# Patient Record
Sex: MALE | Race: WHITE | NOT HISPANIC OR LATINO | URBAN - METROPOLITAN AREA
[De-identification: names, ages, dates, MRNs, and addresses within clinical notes are randomized per-mention and may not be internally consistent; named-entity substitution may affect disease eponyms.]

---

## 2022-03-18 PROBLEM — Z00.00 ENCOUNTER FOR PREVENTIVE HEALTH EXAMINATION: Status: ACTIVE | Noted: 2022-03-18

## 2022-03-19 ENCOUNTER — OUTPATIENT (OUTPATIENT)
Dept: OUTPATIENT SERVICES | Facility: HOSPITAL | Age: 82
LOS: 1 days | End: 2022-03-19
Payer: MEDICARE

## 2022-03-19 ENCOUNTER — APPOINTMENT (OUTPATIENT)
Dept: CT IMAGING | Facility: IMAGING CENTER | Age: 82
End: 2022-03-19
Payer: MEDICARE

## 2022-03-19 DIAGNOSIS — J32.1 CHRONIC FRONTAL SINUSITIS: ICD-10-CM

## 2022-03-19 PROCEDURE — 70486 CT MAXILLOFACIAL W/O DYE: CPT | Mod: 26,MH

## 2022-03-19 PROCEDURE — 70486 CT MAXILLOFACIAL W/O DYE: CPT | Mod: MH

## 2023-09-05 ENCOUNTER — NON-APPOINTMENT (OUTPATIENT)
Age: 83
End: 2023-09-05

## 2023-09-06 ENCOUNTER — APPOINTMENT (OUTPATIENT)
Dept: PULMONOLOGY | Facility: CLINIC | Age: 83
End: 2023-09-06
Payer: MEDICARE

## 2023-09-06 VITALS
OXYGEN SATURATION: 96 % | SYSTOLIC BLOOD PRESSURE: 116 MMHG | BODY MASS INDEX: 22.91 KG/M2 | HEART RATE: 79 BPM | WEIGHT: 146 LBS | TEMPERATURE: 97.5 F | HEIGHT: 67 IN | DIASTOLIC BLOOD PRESSURE: 70 MMHG

## 2023-09-06 DIAGNOSIS — R05.9 COUGH, UNSPECIFIED: ICD-10-CM

## 2023-09-06 PROCEDURE — 94060 EVALUATION OF WHEEZING: CPT

## 2023-09-06 PROCEDURE — 94729 DIFFUSING CAPACITY: CPT

## 2023-09-06 PROCEDURE — 94727 GAS DIL/WSHOT DETER LNG VOL: CPT

## 2023-09-06 PROCEDURE — ZZZZZ: CPT

## 2023-09-06 PROCEDURE — 99205 OFFICE O/P NEW HI 60 MIN: CPT | Mod: 25

## 2023-09-06 RX ORDER — GABAPENTIN 100 MG/1
100 CAPSULE ORAL
Refills: 0 | Status: ACTIVE | COMMUNITY

## 2023-09-06 RX ORDER — FENOFIBRATE 120 MG/1
TABLET ORAL
Refills: 0 | Status: ACTIVE | COMMUNITY

## 2023-09-06 RX ORDER — PRAVASTATIN SODIUM 20 MG/1
20 TABLET ORAL
Refills: 0 | Status: ACTIVE | COMMUNITY

## 2023-09-06 RX ORDER — ASPIRIN 81 MG
81 TABLET, DELAYED RELEASE (ENTERIC COATED) ORAL
Refills: 0 | Status: ACTIVE | COMMUNITY

## 2023-09-06 RX ORDER — AMLODIPINE BESYLATE 2.5 MG/1
2.5 TABLET ORAL
Refills: 0 | Status: ACTIVE | COMMUNITY

## 2023-09-06 RX ORDER — FOLIC ACID 1 MG/1
1 TABLET ORAL
Refills: 0 | Status: ACTIVE | COMMUNITY

## 2023-09-06 NOTE — ASSESSMENT
[FreeTextEntry1] : CT 6 months if cannot obtain old CT from [facility] 2019.  Task sent as reminder. I have held off on chronic bronchodilator therapy at this time since he is relatively asymptomatic.  Given PRN albuterol. Observation.  Follow-up post CT or sooner on a as needed basis.

## 2023-09-06 NOTE — CONSULT LETTER
[Dear  ___] : Dear ~MARY, [Consult Letter:] : I had the pleasure of evaluating your patient, [unfilled]. [Consult Closing:] : Thank you very much for allowing me to participate in the care of this patient.  If you have any questions, please do not hesitate to contact me. [Sincerely,] : Sincerely, [FreeTextEntry2] : Eugenia Butt DO [FreeTextEntry3] : [facility] MD [facility]

## 2023-09-06 NOTE — PHYSICAL EXAM
[No Acute Distress] : no acute distress [Normal Oropharynx] : normal oropharynx [Normal Appearance] : normal appearance [No Neck Mass] : no neck mass [Normal Rate/Rhythm] : normal rate/rhythm [Normal S1, S2] : normal s1, s2 [No Murmurs] : no murmurs [No Resp Distress] : no resp distress [Clear to Auscultation Bilaterally] : clear to auscultation bilaterally [Normal to Percussion] : normal to percussion [No Abnormalities] : no abnormalities [Benign] : benign [No HSM] : no hsm [Normal Gait] : normal gait [No Clubbing] : no clubbing [No Cyanosis] : no cyanosis [No Edema] : no edema [Normal Color/ Pigmentation] : normal color/ pigmentation [No Focal Deficits] : no focal deficits [Oriented x3] : oriented x3 [Normal Affect] : normal affect

## 2023-09-06 NOTE — PROCEDURE
[FreeTextEntry1] : Chest radiograph of over 17 2023 noted and reviewed.  CT of the chest of August 25, 2023 reviewed and discussed with patient and daughter. There are multifocal areas of tree-in-bud and nodular opacities most consistent with areas of mucoid impaction.  Small pulmonary nodules.  No significant adenopathy.  No definitive bronchiectasis Focal density left upper lobe medially most likely related to scarring.  Underlying lesion cannot be excluded.  09/06/2023 Pulmonary function testing FEV1, FVC, and FEV1/FVC are within normal limits. TLC and subdivisions are normal. RV/TLC ratio is normal. Resistance and specific conductance are normal. Single breath diffusion capacity is normal.

## 2023-09-06 NOTE — HISTORY OF PRESENT ILLNESS
[Former] : former [TextBox_4] : MIR ABBASI is a 83 year old  M referred for pulmonary evaluation for  12/22 COVID. Was ill. No hospitaliaztion. Took Paxlovid. No CXR. Developed bronchitis with cough which progressed. Now failry mild. Took prednisone with positive response. 5 days last week 20 mg.  Presently minimal cough. Predominanlty dry Sent for CT last week. Told mild aneurysm.  No CP or SOB. No wheezing.   Past pulmonary history. Abnormal CXR.  Occupational Exposure. .  Family history of pulmonary disease. N Recent travel N Pets Dog not allergic.   no prior CT chest.   No GERD Positive PN Drip.   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  On zanubrutinib 80mg.   [YearQuit] : 2014 [TextBox_22] : Discontinued 5 weeks.

## 2023-09-06 NOTE — DISCUSSION/SUMMARY
[FreeTextEntry1] : Multifocal areas of nodular tree-in-bud opacities likely infectious inflammatory in origin.  Consider chronic infectious etiology such as atypical mycobacterial disease. Pulmonary nodules. Left upper lobe abnormality also most likely infectious inflammatory in origin but underlying neoplastic process cannot be excluded.  Warrants follow-up. Cough now essentially resolved. Component of mild chronic obstructive pulmonary disease.

## 2023-09-15 ENCOUNTER — NON-APPOINTMENT (OUTPATIENT)
Age: 83
End: 2023-09-15

## 2023-09-18 ENCOUNTER — APPOINTMENT (OUTPATIENT)
Dept: HEART AND VASCULAR | Facility: CLINIC | Age: 83
End: 2023-09-18
Payer: MEDICARE

## 2023-09-18 ENCOUNTER — NON-APPOINTMENT (OUTPATIENT)
Age: 83
End: 2023-09-18

## 2023-09-18 DIAGNOSIS — Z86.79 PERSONAL HISTORY OF OTHER DISEASES OF THE CIRCULATORY SYSTEM: ICD-10-CM

## 2023-09-18 DIAGNOSIS — Z85.79 PERSONAL HISTORY OF OTHER MALIGNANT NEOPLASMS OF LYMPHOID, HEMATOPOIETIC AND RELATED TISSUES: ICD-10-CM

## 2023-09-18 DIAGNOSIS — Z86.39 PERSONAL HISTORY OF OTHER ENDOCRINE, NUTRITIONAL AND METABOLIC DISEASE: ICD-10-CM

## 2023-09-18 PROCEDURE — 99205 OFFICE O/P NEW HI 60 MIN: CPT

## 2023-09-20 VITALS
SYSTOLIC BLOOD PRESSURE: 131 MMHG | WEIGHT: 145.06 LBS | RESPIRATION RATE: 16 BRPM | TEMPERATURE: 98 F | DIASTOLIC BLOOD PRESSURE: 62 MMHG | HEIGHT: 66 IN | OXYGEN SATURATION: 98 % | HEART RATE: 68 BPM

## 2023-09-20 RX ORDER — CHLORHEXIDINE GLUCONATE 213 G/1000ML
1 SOLUTION TOPICAL ONCE
Refills: 0 | Status: DISCONTINUED | OUTPATIENT
Start: 2023-09-26 | End: 2023-09-27

## 2023-09-20 NOTE — H&P ADULT - NSICDXPASTMEDICALHX_GEN_ALL_CORE_FT
PAST MEDICAL HISTORY:  CAD (coronary artery disease)     COPD, mild     HLD (hyperlipidemia)     HTN (hypertension)     Lymphoma

## 2023-09-20 NOTE — H&P ADULT - HISTORY OF PRESENT ILLNESS
INCOMPLETE  Doctor: Anirudh  Pharmacy:  Escort:    84 yo M with PMHx HTN, HLD, CAD (s/p 2 stents in 2021), lymphoma (dx 2014 on chemotherapy), COPD (? hospitalizations), presents to Dr. Oleary for evaluation of splenic artery aneurysm. 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 Pt denies any abdominal pain.     Pt ___ denies palpitations, orthopnea, PND, leg edema, n/v/d, fever, chills, hematochezia, hematemesis, melena, BRBPR and other symptoms.     CT Chest without IV Contrast 8/29/23: 3.2cm peripherally calcified structure in the LUQ likely splenic artery aneurysm, incompletely evaluated on the non contrast study. Few scattered multilobar multifocal tree in bud branching nodular opacities consistent with small airways disease/mucoid impaction likely infectious or inflammatory bronchiolitis. Few small subcentimeter pulmonary nodules measuring less than 4mm.     In light of pt's risk factors and abnormal CT finding, pt presents for splenic artery embolization with Dr. Oleary. Doctor: Anirudh  Pharmacy: [facility] (in chart)  Escort: Daughter & son    82 yo M with PMHx HTN, HLD, CAD (s/p 2 stents in 2021), lymphoma (dx 2014 on chemotherapy), COPD (no hx hospitalizations), presents to Dr. Oleary for evaluation of splenic artery aneurysm. 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 Pt denies any abdominal pain. Pt denies palpitations, orthopnea, PND, leg edema, n/v/d, fever, chills, hematochezia, hematemesis, melena, BRBPR and other symptoms.     CT Chest without IV Contrast 8/29/23: 3.2cm peripherally calcified structure in the LUQ likely splenic artery aneurysm, incompletely evaluated on the non contrast study. Few scattered multilobar multifocal tree in bud branching nodular opacities consistent with small airways disease/mucoid impaction likely infectious or inflammatory bronchiolitis. Few small subcentimeter pulmonary nodules measuring less than 4mm.     In light of pt's risk factors and abnormal CT finding, pt presents for splenic artery embolization with Dr. Oleary.

## 2023-09-20 NOTE — H&P ADULT - NSHPLABSRESULTS_GEN_ALL_CORE
EK.5   8.13  )-----------( 312      ( 26 Sep 2023 10:33 )             41.8         PT/INR - ( 26 Sep 2023 10:33 )   PT: 11.3 sec;   INR: 0.99          PTT - ( 26 Sep 2023 10:33 )  PTT:33.3 sec

## 2023-09-20 NOTE — H&P ADULT - ASSESSMENT
84 yo M with PMHx HTN, HLD, CAD (s/p 2 stents in 2021), lymphoma (dx 2014 on chemotherapy), COPD (no hx hospitalizations), presents to [facility] for splenic artery embolization with Dr. Oleary.      ASA III          Mallampati III  -VSS  -No fluids or load indicated  -Consent to be obtained by Dr. Oleary's PA    Risks & benefits of procedure and alternative therapy have been explained to the patient including but not limited to: allergic reaction, bleeding w/possible need for blood transfusion, infection, renal and vascular compromise, limb damage, arrhythmia, stroke, vessel dissection/perforation, Myocardial infarction, emergent CABG. Informed consent obtained and in chart.       Suitable for moderate sedation.

## 2023-09-26 ENCOUNTER — TRANSCRIPTION ENCOUNTER (OUTPATIENT)
Age: 83
End: 2023-09-26

## 2023-09-26 ENCOUNTER — INPATIENT (INPATIENT)
Facility: HOSPITAL | Age: 83
LOS: 0 days | Discharge: HOME | DRG: 272 | End: 2023-09-27
Attending: RADIOLOGY | Admitting: RADIOLOGY
Payer: MEDICARE

## 2023-09-26 DIAGNOSIS — I25.10 ATHEROSCLEROTIC HEART DISEASE OF NATIVE CORONARY ARTERY WITHOUT ANGINA PECTORIS: ICD-10-CM

## 2023-09-26 DIAGNOSIS — Z87.891 PERSONAL HISTORY OF NICOTINE DEPENDENCE: ICD-10-CM

## 2023-09-26 DIAGNOSIS — R91.8 OTHER NONSPECIFIC ABNORMAL FINDING OF LUNG FIELD: ICD-10-CM

## 2023-09-26 DIAGNOSIS — Z97.2 PRESENCE OF DENTAL PROSTHETIC DEVICE (COMPLETE) (PARTIAL): ICD-10-CM

## 2023-09-26 DIAGNOSIS — Z92.21 PERSONAL HISTORY OF ANTINEOPLASTIC CHEMOTHERAPY: ICD-10-CM

## 2023-09-26 DIAGNOSIS — I72.8 ANEURYSM OF OTHER SPECIFIED ARTERIES: ICD-10-CM

## 2023-09-26 DIAGNOSIS — Z85.72 PERSONAL HISTORY OF NON-HODGKIN LYMPHOMAS: ICD-10-CM

## 2023-09-26 DIAGNOSIS — I10 ESSENTIAL (PRIMARY) HYPERTENSION: ICD-10-CM

## 2023-09-26 DIAGNOSIS — J44.9 CHRONIC OBSTRUCTIVE PULMONARY DISEASE, UNSPECIFIED: ICD-10-CM

## 2023-09-26 DIAGNOSIS — Z79.82 LONG TERM (CURRENT) USE OF ASPIRIN: ICD-10-CM

## 2023-09-26 DIAGNOSIS — Z95.5 PRESENCE OF CORONARY ANGIOPLASTY IMPLANT AND GRAFT: ICD-10-CM

## 2023-09-26 DIAGNOSIS — E78.5 HYPERLIPIDEMIA, UNSPECIFIED: ICD-10-CM

## 2023-09-26 LAB
ALBUMIN SERPL ELPH-MCNC: 4.2 G/DL — SIGNIFICANT CHANGE UP (ref 3.3–5)
ALP SERPL-CCNC: 79 U/L — SIGNIFICANT CHANGE UP (ref 40–120)
ALT FLD-CCNC: 22 U/L — SIGNIFICANT CHANGE UP (ref 10–45)
ANION GAP SERPL CALC-SCNC: 8 MMOL/L — SIGNIFICANT CHANGE UP (ref 5–17)
APTT BLD: 33.3 SEC — SIGNIFICANT CHANGE UP (ref 24.5–35.6)
AST SERPL-CCNC: 25 U/L — SIGNIFICANT CHANGE UP (ref 10–40)
BASOPHILS # BLD AUTO: 0.11 K/UL — SIGNIFICANT CHANGE UP (ref 0–0.2)
BASOPHILS NFR BLD AUTO: 1.4 % — SIGNIFICANT CHANGE UP (ref 0–2)
BILIRUB SERPL-MCNC: 0.3 MG/DL — SIGNIFICANT CHANGE UP (ref 0.2–1.2)
BUN SERPL-MCNC: 11 MG/DL — SIGNIFICANT CHANGE UP (ref 7–23)
CALCIUM SERPL-MCNC: 9.4 MG/DL — SIGNIFICANT CHANGE UP (ref 8.4–10.5)
CHLORIDE SERPL-SCNC: 103 MMOL/L — SIGNIFICANT CHANGE UP (ref 96–108)
CO2 SERPL-SCNC: 30 MMOL/L — SIGNIFICANT CHANGE UP (ref 22–31)
CREAT SERPL-MCNC: 1.25 MG/DL — SIGNIFICANT CHANGE UP (ref 0.5–1.3)
EGFR: 57 ML/MIN/1.73M2 — LOW
EOSINOPHIL # BLD AUTO: 0.92 K/UL — HIGH (ref 0–0.5)
EOSINOPHIL NFR BLD AUTO: 11.3 % — HIGH (ref 0–6)
GLUCOSE SERPL-MCNC: 99 MG/DL — SIGNIFICANT CHANGE UP (ref 70–99)
HCT VFR BLD CALC: 41.8 % — SIGNIFICANT CHANGE UP (ref 39–50)
HGB BLD-MCNC: 13.5 G/DL — SIGNIFICANT CHANGE UP (ref 13–17)
IMM GRANULOCYTES NFR BLD AUTO: 0.5 % — SIGNIFICANT CHANGE UP (ref 0–0.9)
INR BLD: 0.99 — SIGNIFICANT CHANGE UP (ref 0.85–1.18)
LYMPHOCYTES # BLD AUTO: 1.35 K/UL — SIGNIFICANT CHANGE UP (ref 1–3.3)
LYMPHOCYTES # BLD AUTO: 16.6 % — SIGNIFICANT CHANGE UP (ref 13–44)
MCHC RBC-ENTMCNC: 32.1 PG — SIGNIFICANT CHANGE UP (ref 27–34)
MCHC RBC-ENTMCNC: 32.3 GM/DL — SIGNIFICANT CHANGE UP (ref 32–36)
MCV RBC AUTO: 99.3 FL — SIGNIFICANT CHANGE UP (ref 80–100)
MONOCYTES # BLD AUTO: 0.95 K/UL — HIGH (ref 0–0.9)
MONOCYTES NFR BLD AUTO: 11.7 % — SIGNIFICANT CHANGE UP (ref 2–14)
NEUTROPHILS # BLD AUTO: 4.76 K/UL — SIGNIFICANT CHANGE UP (ref 1.8–7.4)
NEUTROPHILS NFR BLD AUTO: 58.5 % — SIGNIFICANT CHANGE UP (ref 43–77)
NRBC # BLD: 0 /100 WBCS — SIGNIFICANT CHANGE UP (ref 0–0)
PLATELET # BLD AUTO: 312 K/UL — SIGNIFICANT CHANGE UP (ref 150–400)
POTASSIUM SERPL-MCNC: 4.5 MMOL/L — SIGNIFICANT CHANGE UP (ref 3.5–5.3)
POTASSIUM SERPL-SCNC: 4.5 MMOL/L — SIGNIFICANT CHANGE UP (ref 3.5–5.3)
PROT SERPL-MCNC: 6.8 G/DL — SIGNIFICANT CHANGE UP (ref 6–8.3)
PROTHROM AB SERPL-ACNC: 11.3 SEC — SIGNIFICANT CHANGE UP (ref 9.5–13)
RBC # BLD: 4.21 M/UL — SIGNIFICANT CHANGE UP (ref 4.2–5.8)
RBC # FLD: 14 % — SIGNIFICANT CHANGE UP (ref 10.3–14.5)
SODIUM SERPL-SCNC: 141 MMOL/L — SIGNIFICANT CHANGE UP (ref 135–145)
WBC # BLD: 8.13 K/UL — SIGNIFICANT CHANGE UP (ref 3.8–10.5)
WBC # FLD AUTO: 8.13 K/UL — SIGNIFICANT CHANGE UP (ref 3.8–10.5)

## 2023-09-26 PROCEDURE — 93010 ELECTROCARDIOGRAM REPORT: CPT

## 2023-09-26 PROCEDURE — 37242 VASC EMBOLIZE/OCCLUDE ARTERY: CPT

## 2023-09-26 PROCEDURE — 36246 INS CATH ABD/L-EXT ART 2ND: CPT | Mod: RT

## 2023-09-26 RX ORDER — FOLIC ACID 0.8 MG
1 TABLET ORAL
Refills: 0 | DISCHARGE

## 2023-09-26 RX ORDER — GABAPENTIN 400 MG/1
1 CAPSULE ORAL
Refills: 0 | DISCHARGE

## 2023-09-26 RX ORDER — AMLODIPINE BESYLATE 2.5 MG/1
1 TABLET ORAL
Refills: 0 | DISCHARGE

## 2023-09-26 RX ORDER — KETOROLAC TROMETHAMINE 30 MG/ML
15 SYRINGE (ML) INJECTION EVERY 6 HOURS
Refills: 0 | Status: DISCONTINUED | OUTPATIENT
Start: 2023-09-26 | End: 2023-09-27

## 2023-09-26 RX ORDER — GABAPENTIN 400 MG/1
100 CAPSULE ORAL DAILY
Refills: 0 | Status: DISCONTINUED | OUTPATIENT
Start: 2023-09-27 | End: 2023-09-27

## 2023-09-26 RX ORDER — ZANUBRUTINIB 80 MG/1
2 CAPSULE, GELATIN COATED ORAL
Refills: 0 | DISCHARGE

## 2023-09-26 RX ORDER — ASPIRIN/CALCIUM CARB/MAGNESIUM 324 MG
81 TABLET ORAL DAILY
Refills: 0 | Status: DISCONTINUED | OUTPATIENT
Start: 2023-09-27 | End: 2023-09-27

## 2023-09-26 RX ORDER — ACETAMINOPHEN 500 MG
1000 TABLET ORAL ONCE
Refills: 0 | Status: DISCONTINUED | OUTPATIENT
Start: 2023-09-26 | End: 2023-09-27

## 2023-09-26 RX ORDER — FENOFIBRIC ACID 105 MG/1
1 TABLET ORAL
Refills: 0 | DISCHARGE

## 2023-09-26 RX ORDER — ASPIRIN/CALCIUM CARB/MAGNESIUM 324 MG
1 TABLET ORAL
Refills: 0 | DISCHARGE

## 2023-09-26 RX ORDER — ACETAMINOPHEN 500 MG
650 TABLET ORAL EVERY 6 HOURS
Refills: 0 | Status: DISCONTINUED | OUTPATIENT
Start: 2023-09-26 | End: 2023-09-27

## 2023-09-26 RX ORDER — AMLODIPINE BESYLATE 2.5 MG/1
2.5 TABLET ORAL DAILY
Refills: 0 | Status: DISCONTINUED | OUTPATIENT
Start: 2023-09-27 | End: 2023-09-27

## 2023-09-26 NOTE — BRIEF OPERATIVE NOTE - COMMENTS
Admit to inpatient for observation  Neurovascular check per post cath protocol  Folow up with Dr. Oleary in  6 weeks  Post procedure keflex 500mg PO q6h X5 days.  Pain medication with tylenol 650mg PO q6h when inpatient then continue as prn on discharge.  Will follow Admit to inpatient for observation  Neurovascular check per post cath protocol  Follow up with Dr. Oleary in  6 weeks  Post procedure keflex 500mg PO q6h X5 days.  Pain medication with tylenol 650mg PO q6h when inpatient then continue as prn on discharge.  Will follow Admit to inpatient for observation  Delirium precaution  Neurovascular check per post cath protocol  Bedrest until 11PM 9/26  Post procedure keflex 500mg PO q6h X5 days.  Pain medication with tylenol 650mg PO q6h, toradol 15mg IV q6h PRN for mod-severe pain when inpatient then continue tylenol prn on discharge.  Will follow

## 2023-09-26 NOTE — PRE-ANESTHESIA EVALUATION ADULT - NSANTHOSAYNRD_GEN_A_CORE
No. SOLITARIO screening performed.  STOP BANG Legend: 0-2 = LOW Risk; 3-4 = INTERMEDIATE Risk; 5-8 = HIGH Risk

## 2023-09-26 NOTE — BRIEF OPERATIVE NOTE - NSICDXBRIEFPROCEDURE_GEN_ALL_CORE_FT
PROCEDURES:  Repair splenic artery, percutaneous endoscopic approach 26-Sep-2023 17:10:17  Dejah Wilkerson

## 2023-09-26 NOTE — BRIEF OPERATIVE NOTE - OPERATION/FINDINGS
pt was under GETA,the right CFA was accessed using micropuncture technique and a 5F sheath. Angiogram of celiac trunk performed demonstrating aneurysmal dilation of the splenic artery. Coil embolization performed using a total of 10 detachable coils ( 6mm,8mm and 10mm x20mm and x30mm). Repeat angiogram shows complete occlusion of the aneurysm and with preserved perfusion to the spleen. Sheath removed with manual compression of 15 mins with hemostasis achieved.

## 2023-09-27 ENCOUNTER — TRANSCRIPTION ENCOUNTER (OUTPATIENT)
Age: 83
End: 2023-09-27

## 2023-09-27 VITALS
HEART RATE: 72 BPM | TEMPERATURE: 98 F | DIASTOLIC BLOOD PRESSURE: 54 MMHG | SYSTOLIC BLOOD PRESSURE: 101 MMHG | RESPIRATION RATE: 16 BRPM | OXYGEN SATURATION: 94 %

## 2023-09-27 LAB
AMYLASE P1 CFR SERPL: 52 U/L — SIGNIFICANT CHANGE UP (ref 25–125)
ANION GAP SERPL CALC-SCNC: 8 MMOL/L — SIGNIFICANT CHANGE UP (ref 5–17)
BUN SERPL-MCNC: 10 MG/DL — SIGNIFICANT CHANGE UP (ref 7–23)
CALCIUM SERPL-MCNC: 8.8 MG/DL — SIGNIFICANT CHANGE UP (ref 8.4–10.5)
CHLORIDE SERPL-SCNC: 105 MMOL/L — SIGNIFICANT CHANGE UP (ref 96–108)
CO2 SERPL-SCNC: 25 MMOL/L — SIGNIFICANT CHANGE UP (ref 22–31)
CREAT SERPL-MCNC: 1.08 MG/DL — SIGNIFICANT CHANGE UP (ref 0.5–1.3)
EGFR: 69 ML/MIN/1.73M2 — SIGNIFICANT CHANGE UP
GLUCOSE SERPL-MCNC: 137 MG/DL — HIGH (ref 70–99)
HCT VFR BLD CALC: 37.4 % — LOW (ref 39–50)
HGB BLD-MCNC: 12.3 G/DL — LOW (ref 13–17)
MCHC RBC-ENTMCNC: 32.4 PG — SIGNIFICANT CHANGE UP (ref 27–34)
MCHC RBC-ENTMCNC: 32.9 GM/DL — SIGNIFICANT CHANGE UP (ref 32–36)
MCV RBC AUTO: 98.4 FL — SIGNIFICANT CHANGE UP (ref 80–100)
NRBC # BLD: 0 /100 WBCS — SIGNIFICANT CHANGE UP (ref 0–0)
PLATELET # BLD AUTO: 289 K/UL — SIGNIFICANT CHANGE UP (ref 150–400)
POTASSIUM SERPL-MCNC: 4 MMOL/L — SIGNIFICANT CHANGE UP (ref 3.5–5.3)
POTASSIUM SERPL-SCNC: 4 MMOL/L — SIGNIFICANT CHANGE UP (ref 3.5–5.3)
RBC # BLD: 3.8 M/UL — LOW (ref 4.2–5.8)
RBC # FLD: 14 % — SIGNIFICANT CHANGE UP (ref 10.3–14.5)
SODIUM SERPL-SCNC: 138 MMOL/L — SIGNIFICANT CHANGE UP (ref 135–145)
WBC # BLD: 12.29 K/UL — HIGH (ref 3.8–10.5)
WBC # FLD AUTO: 12.29 K/UL — HIGH (ref 3.8–10.5)

## 2023-09-27 RX ORDER — ACETAMINOPHEN 500 MG
2 TABLET ORAL
Qty: 0 | Refills: 0 | DISCHARGE
Start: 2023-09-27

## 2023-09-27 RX ORDER — CEPHALEXIN 500 MG
1 CAPSULE ORAL
Qty: 20 | Refills: 0
Start: 2023-09-27 | End: 2023-10-01

## 2023-09-27 NOTE — DISCHARGE NOTE PROVIDER - NSDCFUADDINST_GEN_ALL_CORE_FT
Please continue to follow a heart healthy diet, low in sodium, cholesterol and fats.   We recommend a Mediterranean diet:  -Incorporate 2 or more servings per day of vegetables, fruits, and whole grains  -Increase intake of fish and legumes/beans to 2 or more servings per week  -Increase intake of healthy fats, such as olive oil and avocados, and have a handful of nuts/seeds most days  -Reduce red/processed meat consumption to 2 or fewer times per week

## 2023-09-27 NOTE — DISCHARGE NOTE PROVIDER - NSDCCPCAREPLAN_GEN_ALL_CORE_FT
PRINCIPAL DISCHARGE DIAGNOSIS  Diagnosis: Splenic artery aneurysm  Assessment and Plan of Treatment: You received a splenic artery embolization yesterday 9/26/23 with Dr. Oleary. You might be in some discomfort followng this procedure.   Please continue Keflex 500mg q 6 hours x 5 days      SECONDARY DISCHARGE DIAGNOSES  Diagnosis: CAD (coronary artery disease)  Assessment and Plan of Treatment:     Diagnosis: HLD (hyperlipidemia)  Assessment and Plan of Treatment:     Diagnosis: HTN (hypertension)  Assessment and Plan of Treatment:      PRINCIPAL DISCHARGE DIAGNOSIS  Diagnosis: Splenic artery aneurysm  Assessment and Plan of Treatment: You received a splenic artery embolization yesterday 9/26/23 with Dr. Oleary. You might be in some discomfort followng this procedure.   Please start Keflex 500mg every 6 hours for 5 days and tylenol 650mg every 6 hours as needed for pain.   Avoid strenuous activity or heavy lifting anything more than 5lbs for the next five days. Do not take a bath or swim for the next five days; you may shower. For any bleeding or hematoma formation (hardened blood collection under the skin) at the access site of your  R groin please hold pressure and go to the emergency room. Please follow up with Dr. Oleary in 1-2 weeks. For recurrent chest pain, please call your doctor or go to the emergency room.        SECONDARY DISCHARGE DIAGNOSES  Diagnosis: CAD (coronary artery disease)  Assessment and Plan of Treatment: You have a history of coronary artery disease, please continue aspirin 81mg daily.    Diagnosis: HLD (hyperlipidemia)  Assessment and Plan of Treatment: You have a history of hyperlipidemia. Please continue pravastatin 10mg daily to lower your cholesterol.    Diagnosis: HTN (hypertension)  Assessment and Plan of Treatment: You have a history of elevated blood pressure. Please continue amlodipine 2.5mg daily to keep your blood pressure controlled.

## 2023-09-27 NOTE — DISCHARGE NOTE PROVIDER - HOSPITAL COURSE
84 yo M with PMHx HTN, HLD, CAD (s/p 2 stents in 2021), lymphoma (dx 2014 on chemotherapy), COPD (no hx hospitalizations), presents to Dr. Oleary for evaluation of splenic artery aneurysm. 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 Pt denies any abdominal pain. Pt denies palpitations, orthopnea, PND, leg edema, n/v/d, fever, chills, hematochezia, hematemesis, melena, BRBPR and other symptoms.     CT Chest without IV Contrast 8/29/23: 3.2cm peripherally calcified structure in the LUQ likely splenic artery aneurysm, incompletely evaluated on the non contrast study. Few scattered multilobar multifocal tree in bud branching nodular opacities consistent with small airways disease/mucoid impaction likely infectious or inflammatory bronchiolitis. Few small subcentimeter pulmonary nodules measuring less than 4mm.     In light of pt's risk factors and abnormal CT finding, pt presented for splenic artery embolization with Dr. Oleary. Pt is now s/p 9/26/23 splenic artery embolization, angiogram of celiac trunk demonstrated aneurysmal dilation of the splenic artery. Coil embolization was performed using a total of 10 detachable coils (6mm, 8mm, and 10mm x 20mm and x 30mm). Repeat angiogram shows complete occlusion of the aneurysm and with preserved perfusion to the spleen. RRFA Sheath removed with manual compression of 15 minutes. Pt was started on 500mg of Keflex every 6 hours and will continue this for 5 days.    Pt seen and examined at bedside this AM without any complaints or events overnight, VSS, labs and telemetry reviewed and pt stable for discharge as discussed with Dr. Oleary. Pt has received appropriate discharge instructions, including medication regimen, access site management and follow up with Dr. Oleary in 1-2 weeks.    Discharge medications: ASA 81mg QD, amlodipine 2.5mg daily, gabapentin 100mg daily, Keflex 500mg q6 hours, fenofibric acid 135mg daily, folic acid 1mg oral, pravastatin 10mg daily, zanubrutinib 80mg daily       84 yo M with PMHx HTN, HLD, CAD (s/p 2 stents in 2021), lymphoma (dx 2014 on chemotherapy), COPD (no hx hospitalizations), presents to Dr. Oleary for evaluation of splenic artery aneurysm. 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     CT Chest without IV Contrast 8/29/23: 3.2cm peripherally calcified structure in the LUQ likely splenic artery aneurysm, incompletely evaluated on the non contrast study. Few scattered multilobar multifocal tree in bud branching nodular opacities consistent with small airways disease/mucoid impaction likely infectious or inflammatory bronchiolitis. Few small subcentimeter pulmonary nodules measuring less than 4mm.     In light of pt's risk factors and abnormal CT finding, pt presented for splenic artery embolization with Dr. Oleary. Pt is now s/p 9/26/23 splenic artery embolization, angiogram of celiac trunk demonstrated aneurysmal dilation of the splenic artery. Coil embolization was performed using a total of 10 detachable coils (6mm, 8mm, and 10mm x 20mm and x 30mm). Repeat angiogram shows complete occlusion of the aneurysm and with preserved perfusion to the spleen. RRFA Sheath removed with manual compression of 15 minutes. Pt was started on 500mg of Keflex every 6 hours and will continue this for 5 days.    Pt seen and examined at bedside this AM without any complaints or events overnight, VSS, labs and telemetry reviewed and pt stable for discharge as discussed with Dr. Oleary. Pt has received appropriate discharge instructions, including medication regimen, access site management and follow up with Dr. Oleary in 1-2 weeks.    Discharge medications: ASA 81mg QD, amlodipine 2.5mg daily, gabapentin 100mg daily, Keflex 500mg q6 hours for 5 days, fenofibric acid 135mg daily, folic acid 1mg oral, pravastatin 10mg daily, zanubrutinib 80mg daily

## 2023-09-27 NOTE — DISCHARGE NOTE NURSING/CASE MANAGEMENT/SOCIAL WORK - NSDCPEFALRISK_GEN_ALL_CORE
For information on Fall & Injury Prevention, visit: [email or web address] OR  [email or web address] OR  https://www.cdc.gov/steadi/patient.html

## 2023-09-27 NOTE — DISCHARGE NOTE NURSING/CASE MANAGEMENT/SOCIAL WORK - PATIENT PORTAL LINK FT
You can access the FollowMyHealth Patient Portal offered by [facility] by registering at the following website: [email or web address]. By joining [email or web address] [email or web address] portal, you will also be able to view your health information using other applications (apps) compatible with our system.

## 2023-09-27 NOTE — DISCHARGE NOTE PROVIDER - NSDCMRMEDTOKEN_GEN_ALL_CORE_FT
acetaminophen 325 mg oral tablet: 2 tab(s) orally every 6 hours  amLODIPine 2.5 mg oral tablet: 1 tab(s) orally once a day  aspirin 81 mg oral tablet: 1 tab(s) orally once a day  fenofibric acid 135 mg oral delayed release capsule: 1 cap(s) orally once a day  folic acid 1 mg oral tablet: 1 tab(s) orally once a day  gabapentin 100 mg oral capsule: 1 cap(s) orally once a day  pravastatin 10 mg oral tablet: 1 tab(s) orally once a day (at bedtime)  zanubrutinib 80 mg oral capsule: 2 cap(s) orally 2 times a day   acetaminophen 325 mg oral tablet: 2 tab(s) orally every 6 hours  amLODIPine 2.5 mg oral tablet: 1 tab(s) orally once a day  aspirin 81 mg oral tablet: 1 tab(s) orally once a day  cephalexin 500 mg oral capsule: 1 cap(s) orally every 6 hours  fenofibric acid 135 mg oral delayed release capsule: 1 cap(s) orally once a day  folic acid 1 mg oral tablet: 1 tab(s) orally once a day  gabapentin 100 mg oral capsule: 1 cap(s) orally once a day  pravastatin 10 mg oral tablet: 1 tab(s) orally once a day (at bedtime)  zanubrutinib 80 mg oral capsule: 2 cap(s) orally 2 times a day

## 2023-09-27 NOTE — DISCHARGE NOTE PROVIDER - NSDCCPGOAL_GEN_ALL_CORE_FT
Spoke to MD regarding critical magnesium levels. Orders received To get better and follow your care plan as instructed.

## 2023-10-02 ENCOUNTER — APPOINTMENT (OUTPATIENT)
Dept: PULMONOLOGY | Facility: CLINIC | Age: 83
End: 2023-10-02

## 2023-11-13 PROCEDURE — 93005 ELECTROCARDIOGRAM TRACING: CPT

## 2023-11-13 PROCEDURE — 80053 COMPREHEN METABOLIC PANEL: CPT

## 2023-11-13 PROCEDURE — C1887: CPT

## 2023-11-13 PROCEDURE — 85027 COMPLETE CBC AUTOMATED: CPT

## 2023-11-13 PROCEDURE — C1894: CPT

## 2023-11-13 PROCEDURE — 36415 COLL VENOUS BLD VENIPUNCTURE: CPT

## 2023-11-13 PROCEDURE — C1889: CPT

## 2023-11-13 PROCEDURE — 80048 BASIC METABOLIC PNL TOTAL CA: CPT

## 2023-11-13 PROCEDURE — 85610 PROTHROMBIN TIME: CPT

## 2023-11-13 PROCEDURE — 85730 THROMBOPLASTIN TIME PARTIAL: CPT

## 2023-11-13 PROCEDURE — 82150 ASSAY OF AMYLASE: CPT

## 2023-11-13 PROCEDURE — C1769: CPT

## 2023-11-13 PROCEDURE — 85025 COMPLETE CBC W/AUTO DIFF WBC: CPT

## 2024-03-06 ENCOUNTER — APPOINTMENT (OUTPATIENT)
Dept: PULMONOLOGY | Facility: CLINIC | Age: 84
End: 2024-03-06
Payer: MEDICARE

## 2024-03-06 VITALS
DIASTOLIC BLOOD PRESSURE: 61 MMHG | BODY MASS INDEX: 24.64 KG/M2 | HEIGHT: 67 IN | HEART RATE: 70 BPM | TEMPERATURE: 97.9 F | OXYGEN SATURATION: 97 % | WEIGHT: 157 LBS | SYSTOLIC BLOOD PRESSURE: 135 MMHG

## 2024-03-06 DIAGNOSIS — R91.8 OTHER NONSPECIFIC ABNORMAL FINDING OF LUNG FIELD: ICD-10-CM

## 2024-03-06 DIAGNOSIS — J44.9 CHRONIC OBSTRUCTIVE PULMONARY DISEASE, UNSPECIFIED: ICD-10-CM

## 2024-03-06 DIAGNOSIS — Z87.891 PERSONAL HISTORY OF NICOTINE DEPENDENCE: ICD-10-CM

## 2024-03-06 PROBLEM — I10 ESSENTIAL (PRIMARY) HYPERTENSION: Chronic | Status: ACTIVE | Noted: 2023-09-20

## 2024-03-06 PROBLEM — C85.90 NON-HODGKIN LYMPHOMA, UNSPECIFIED, UNSPECIFIED SITE: Chronic | Status: ACTIVE | Noted: 2023-09-20

## 2024-03-06 PROBLEM — I25.10 ATHEROSCLEROTIC HEART DISEASE OF NATIVE CORONARY ARTERY WITHOUT ANGINA PECTORIS: Chronic | Status: ACTIVE | Noted: 2023-09-20

## 2024-03-06 PROBLEM — E78.5 HYPERLIPIDEMIA, UNSPECIFIED: Chronic | Status: ACTIVE | Noted: 2023-09-20

## 2024-03-06 PROCEDURE — 94727 GAS DIL/WSHOT DETER LNG VOL: CPT

## 2024-03-06 PROCEDURE — 94729 DIFFUSING CAPACITY: CPT

## 2024-03-06 PROCEDURE — 99214 OFFICE O/P EST MOD 30 MIN: CPT | Mod: 25

## 2024-03-06 PROCEDURE — 85018 HEMOGLOBIN: CPT | Mod: QW

## 2024-03-06 PROCEDURE — 94060 EVALUATION OF WHEEZING: CPT

## 2024-03-06 NOTE — CONSULT LETTER
[Dear  ___] : Dear ~MARY, [Consult Letter:] : I had the pleasure of evaluating your patient, [unfilled]. [Consult Closing:] : Thank you very much for allowing me to participate in the care of this patient.  If you have any questions, please do not hesitate to contact me. [Sincerely,] : Sincerely, [FreeTextEntry2] : Eugenia Butt DO [FreeTextEntry3] : [facility]

## 2024-03-06 NOTE — ASSESSMENT
[FreeTextEntry1] : Ct chest ordered.  I have held off on chronic bronchodilator therapy at this time since he is relatively asymptomatic.  Given PRN albuterol. Observation.  Follow-up in 1 year or sooner on a as needed basis. Will call and review CT.

## 2024-03-06 NOTE — PROCEDURE
[FreeTextEntry1] : CT of the chest of August 25, 2023 reviewed and discussed with patient and daughter. There are multifocal areas of tree-in-bud and nodular opacities most consistent with areas of mucoid impaction.  Small pulmonary nodules.  No significant adenopathy.  No definitive bronchiectasis Focal density left upper lobe medially most likely related to scarring.  Underlying lesion cannot be excluded.  03/06/2024 Pulmonary function testing These data demonstrate a mild obstructive ventilatory deficit. There is no significant bronchodilator responce. There is elevation in the RV/TLC ratio indicative of possible air trapping. Diffusion capacity is normal.  Compared to September 2023 there is no significant change in flow rates.  There is a mild decrease in diffusion capacity.

## 2024-03-06 NOTE — HISTORY OF PRESENT ILLNESS
[Former] : former [TextBox_4] : Here for f/u and PFT Due for f/u ct chest. No cough cough wheezing  12/22 COVID. Was ill. No hospitaliaztion. Took Paxlovid. No CXR. Developed bronchitis with cough , helped with prednione . Presently minimal cough. Predominantly dry No CP or SOB. No wheezing.  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  On zanubrutinib 80mg.   [YearQuit] : 2014 [TextBox_22] : Discontinued 5 weeks.

## 2025-06-25 ENCOUNTER — APPOINTMENT (OUTPATIENT)
Dept: PULMONOLOGY | Facility: CLINIC | Age: 85
End: 2025-06-25